# Patient Record
Sex: MALE | URBAN - METROPOLITAN AREA
[De-identification: names, ages, dates, MRNs, and addresses within clinical notes are randomized per-mention and may not be internally consistent; named-entity substitution may affect disease eponyms.]

---

## 2018-04-17 ENCOUNTER — APPOINTMENT (RX ONLY)
Dept: URBAN - METROPOLITAN AREA CLINIC 128 | Facility: CLINIC | Age: 48
Setting detail: DERMATOLOGY
End: 2018-04-17

## 2018-04-17 DIAGNOSIS — D17 BENIGN LIPOMATOUS NEOPLASM: ICD-10-CM

## 2018-04-17 PROBLEM — F32.9 MAJOR DEPRESSIVE DISORDER, SINGLE EPISODE, UNSPECIFIED: Status: ACTIVE | Noted: 2018-04-17

## 2018-04-17 PROBLEM — D48.5 NEOPLASM OF UNCERTAIN BEHAVIOR OF SKIN: Status: ACTIVE | Noted: 2018-04-17

## 2018-04-17 PROCEDURE — ? ADDITIONAL NOTES

## 2018-04-17 PROCEDURE — ? COUNSELING

## 2018-04-17 PROCEDURE — 99202 OFFICE O/P NEW SF 15 MIN: CPT

## 2018-04-17 ASSESSMENT — LOCATION SIMPLE DESCRIPTION DERM: LOCATION SIMPLE: LEFT SCALP

## 2018-04-17 ASSESSMENT — LOCATION ZONE DERM: LOCATION ZONE: SCALP

## 2018-04-17 ASSESSMENT — LOCATION DETAILED DESCRIPTION DERM: LOCATION DETAILED: LEFT MEDIAL FRONTAL SCALP

## 2018-04-17 NOTE — HPI: NODULE (SMALL BUMP UNDERNEATH SKIN)
How Severe Is Your Nodule?: mild
Is This A New Presentation, Or A Follow-Up?: Nodule
Additional History: Pt states noticing headaches around the same time nodule came up.

## 2018-04-17 NOTE — PROCEDURE: ADDITIONAL NOTES
Additional Notes: Pt advised this could be removed with a surgical excision, however was advised to see a plastic surgeon for biopsy/excision. I provided a patient for his pcp recommending [facility] to [facility]general [facility]. The patient will bring letter to his pcp. The nodule has been present for many years. Patient believes it may have recently enlarged but he is unsure. Non tender to palpation, mobile nodule.